# Patient Record
Sex: FEMALE | Race: WHITE
[De-identification: names, ages, dates, MRNs, and addresses within clinical notes are randomized per-mention and may not be internally consistent; named-entity substitution may affect disease eponyms.]

---

## 2020-02-27 ENCOUNTER — HOSPITAL ENCOUNTER (OUTPATIENT)
Dept: HOSPITAL 56 - MW.SDS | Age: 75
Discharge: HOME | End: 2020-02-27
Attending: SURGERY
Payer: COMMERCIAL

## 2020-02-27 VITALS — SYSTOLIC BLOOD PRESSURE: 137 MMHG | HEART RATE: 51 BPM | DIASTOLIC BLOOD PRESSURE: 53 MMHG

## 2020-02-27 DIAGNOSIS — Z98.890: ICD-10-CM

## 2020-02-27 DIAGNOSIS — D12.2: Primary | ICD-10-CM

## 2020-02-27 DIAGNOSIS — E66.3: ICD-10-CM

## 2020-02-27 DIAGNOSIS — E78.00: ICD-10-CM

## 2020-02-27 DIAGNOSIS — Z80.0: ICD-10-CM

## 2020-02-27 DIAGNOSIS — Z79.899: ICD-10-CM

## 2020-02-27 DIAGNOSIS — K21.9: ICD-10-CM

## 2020-02-27 PROCEDURE — 45380 COLONOSCOPY AND BIOPSY: CPT

## 2020-02-27 NOTE — PCM.OPNOTE
<Armando Tovar M - Last Filed: 02/27/20 11:12>





- General Post-Op/Procedure Note


Date of Surgery/Procedure: 02/27/20


Operative Procedure(s): Colonoscopy with polypectomy


Findings: 


Ascending colon polyp x 2


Pre Op Diagnosis: Screening colonoscopy


Post-Op Diagnosis: Ascending colon polyp x2


Anesthesia Technique: MAC


Primary Surgeon: Tracey Fitzpatrick


Complications: None


Condition: Stable





<Tracey Fitzpatrick M - Last Filed: 02/27/20 11:53>





- General Post-Op/Procedure Note


Free Text/Narrative:: 


 Intake & Output











 02/26/20 02/27/20 02/27/20





 22:59 06:59 14:59


 


Intake Total   600


 


Balance   600

## 2020-02-27 NOTE — PCM.POSTAN
POST ANESTHESIA ASSESSMENT





- MENTAL STATUS


Mental Status: Alert, Oriented





- VITAL SIGNS


Vital Signs: 


 Last Vital Signs











Temp  36.3 C   02/27/20 09:29


 


Pulse  47 L  02/27/20 11:28


 


Resp  14   02/27/20 11:28


 


BP  121/60   02/27/20 11:28


 


Pulse Ox  94 L  02/27/20 11:28














- RESPIRATORY


Respiratory Status: Respiratory Rate WNL, Airway Patent, O2 Saturation Stable





- CARDIOVASCULAR


CV Status: Pulse Rate WNL, Blood Pressure Stable





- GASTROINTESTINAL


GI Status: No Symptoms





- PAIN


Pain Score: 0





- POST OP HYDRATION


Hydration Status: Adequate & Stable

## 2020-02-27 NOTE — PCM.PREANE
Preanesthetic Assessment





- Anesthesia/Transfusion/Family Hx


Anesthesia History: Prior Anesthesia Without Reaction


Family History of Anesthesia Reaction: No


Transfusion History: No Prior Transfusion(s)





- Review of Systems


General: No Symptoms


Pulmonary: No Symptoms


Cardiovascular: No Symptoms


Neurological: No Symptoms


Other: Reports: None





- Physical Assessment


NPO Status Date: 02/26/20


Vital Signs: 





 Last Vital Signs











Temp  97.3 F   02/27/20 09:29


 


Pulse  62   02/27/20 09:29


 


Resp  16   02/27/20 09:29


 


BP  135/75   02/27/20 09:29


 


Pulse Ox  95   02/27/20 09:29











Height: 5 ft 5 in


Weight: 78.471 kg


ASA Class: 2


Mental Status: Alert & Oriented x3


Dentition: Reports: Normal Dentition


ROM/Head Extension: Full


Lungs: Clear to Auscultation, Normal Respiratory Effort


Cardiovascular: Regular Rate, Regular Rhythm





- Allergies


Allergies/Adverse Reactions: 


 Allergies











Allergy/AdvReac Type Severity Reaction Status Date / Time


 


No Known Allergies Allergy   Verified 02/21/20 16:58














- Blood


Blood Available: No





- Anesthesia Plan


Pre-Op Medication Ordered: None





- Acknowledgements


Anesthesia Type Planned: General Anesthesia


Pt an Appropriate Candidate for the Planned Anesthesia: Yes


Alternatives and Risks of Anesthesia Discussed w Pt/Guardian: Yes


Pt/Guardian Understands and Agrees with Anesthesia Plan: Yes


Additional Comments: 





PMH: gerd, glaucoma (does not have HIV- error on Active problem list)


PLAN: tiva





PreAnesthesia Questionnaire





- Past Health History


Medical/Surgical History: Denies Medical/Surgical History


HEENT History: Reports: Glaucoma, Other (See Below)


Other HEENT History: wears glasses


Cardiovascular History: Reports: None


Respiratory History: Reports: None


Gastrointestinal History: Reports: Other (See Below)


Other Gastrointestinal History: occasional heartburn- takes tums


Genitourinary History: Reports: None


OB/GYN History: Reports: Pregnancy


Musculoskeletal History: Reports: None


Neurological History: Reports: None


Psychiatric History: Reports: None


Endocrine/Metabolic History: Reports: None


Hematologic History: Reports: None


Immunologic History: Reports: None


Oncologic (Cancer) History: Reports: None


Dermatologic History: Reports: None





- Infectious Disease History


Infectious Disease History: Reports: None





- Past Surgical History


Head Surgeries/Procedures: Reports: None


GI Surgical History: Reports: Appendectomy, Colonoscopy


Other Musculoskeletal Surgeries/Procedures:: Lt thumb surgery years ago





- SUBSTANCE USE


Smoking Status *Q: Never Smoker


Recreational Drug Use History: No





- HOME MEDS


Home Medications: 


 Home Meds





Calcium Carbonate/Vitamin D3 [Calcium 250+D] 1 tab PO DAILY 02/21/20 [History]


Cholecalciferol (Vitamin D3) [Vitamin D3] 2,000 unit PO DAILY 02/21/20 [History]


Chondroitin/Glucosamine [Glucosamine-Chondroitin] 2 cap PO DAILY 02/21/20 [

History]


Latanoprost/Pf [Latanoprost 0.005% Eye Drop] 1 drop EYEBOTH BID 02/21/20 [

History]


timoloL maleate [Timoptic 0.25% Ophth Soln] 1 drop EYEBOTH BID 02/21/20 [History

]











- CURRENT (IN HOUSE) MEDS


Current Meds: 





 Current Medications





Lactated Ringer's (Ringers, Lactated)  1,000 mls @ 125 mls/hr IV ASDIRECTED HAYES


   Last Admin: 02/27/20 09:34 Dose:  125 mls/hr


Sodium Chloride (Saline Flush)  10 ml FLUSH ASDIRECTED PRN


   PRN Reason: Keep Vein Open


Sodium Chloride (Saline Flush)  2.5 ml FLUSH ASDIRECTED PRN


   PRN Reason: Keep Vein Open


Sodium Chloride (Saline Flush)  10 ml FLUSH ASDIRECTED PRN


   PRN Reason: Keep Vein Open


Sodium Chloride (Saline Flush)  2.5 ml FLUSH ASDIRECTED PRN


   PRN Reason: Keep Vein Open


Sodium Chloride (Normal Saline)  10 ml IV ASDIRECTED PRN


   PRN Reason: IV Use





Discontinued Medications





Fentanyl (Sublimaze) Confirm Administered Dose 100 mcg .ROUTE .STK-MED ONE


   Stop: 02/27/20 08:29


Lidocaine (Xylocaine-Mpf 2%) Confirm Administered Dose 5 ml .ROUTE .STK-MED ONE


   Stop: 02/27/20 08:34


Lidocaine HCl (Xylocaine 2%) Confirm Administered Dose 100 mg .ROUTE .STK-MED 

ONE


   Stop: 02/27/20 08:29


Propofol (Diprivan  20 Ml) Confirm Administered Dose 200 mg .ROUTE .STK-MED ONE


   Stop: 02/27/20 08:29

## 2020-02-27 NOTE — [id number]
Post Anesthesia Note





- EVALUATION WITHIN 48HRS OF ANESTHETIC


Vital Signs in Normal Range: Yes


Patient Participated in Evaluation: Yes


Respiratory Function Stable: Yes


Airway Patent: Yes


Cardiovascular Function Stable: Yes


Hydration Status Stable: Yes


Pain Control Satisfactory: Yes


Nausea and Vomiting Control Satisfactory: Yes


Mental Status Recovered: Yes


Vital Signs: 


 Last Vital Signs











Temp  36.3 C   02/27/20 09:29


 


Pulse  47 L  02/27/20 11:28


 


Resp  14   02/27/20 11:28


 


BP  121/60   02/27/20 11:28


 


Pulse Ox  94 L  02/27/20 11:28

## 2020-02-28 NOTE — OR
SURGEON:

TRACEY FITZPATRICK MD

 

DATE OF PROCEDURE:  02/27/2020

 

PREOPERATIVE DIAGNOSIS:

Family history of colon polyps.

 

POSTOPERATIVE DIAGNOSIS:

Ascending colon polyps x2.

 

PROCEDURE PERFORMED:

Diagnostic colonoscopy with polypectomy.

 

PRIMARY SURGEON:

Tracey Fitzpatrick MD.

 

ANESTHESIA:

MAC.

 

INSTRUMENT USED:

Olympus colonoscope.

 

EXTENT OF EXAM:

To the cecum.

 

PREPARATION:

Good.

 

LIMITATIONS:

None.

 

INDICATIONS FOR EXAMINATION:

The patient is a 74-year-old female who presents for a screening colonoscopy.

Her sister was diagnosed with colon cancer.  The patient and I discussed the

procedure; expected perioperative course; and risks including bleeding,

infection, or damage to surrounding structures including perforation.  The

patient verbalized understanding and wishes to proceed.

 

PROCEDURE IN DETAIL:

The patient was brought into the endoscopy suite and placed in the left lateral

decubitus position.  A time-out was completed verifying the patient's name, age,

date of birth, allergies, and procedure to be performed.  Monitored anesthesia

care was induced and continuous oxygen was provided via nasal cannula throughout

the procedure.  After adequate sedation was achieved, a digital rectal exam was

performed.  This exam was within normal limits.  A well-lubricated colonoscope

was inserted in the rectum and advanced under direct visualization to the level

of the cecum.  The cecum was identified by both visual and anatomic landmarks.

A photograph was taken of the cecal cap as well as with the scope retroflexed

within the cecum.  Scope was then fully withdrawn while examining the color,

texture, anatomy, and integrity of the mucosa from the cecum to the anal canal.

The patient was found to have two small sessile polyps in the distal ascending

colon.  These were removed in piecemeal fashion using cold biopsy forceps.  The

remainder of the colon appeared normal.  The scope was brought into the rectum

and retroflexed to allow visualization of the anal canal opening.  This appeared

normal and a photograph was taken.  The scope was then straightened out and

fully withdrawn.  Cecum to anus time was 9 minutes.  The patient tolerated the

procedure well and was transferred to the PACU in stable condition.

 

ENDOSCOPIC DIAGNOSIS:

Ascending colon polyps x2.

 

RECOMMENDATIONS:

Follow up in clinic in 2 weeks.

 

 

SEAN LOYA

DD:  02/28/2020 15:05:20

DT:  02/28/2020 17:29:22

Job #:  [id number]